# Patient Record
Sex: MALE | Race: WHITE | URBAN - METROPOLITAN AREA
[De-identification: names, ages, dates, MRNs, and addresses within clinical notes are randomized per-mention and may not be internally consistent; named-entity substitution may affect disease eponyms.]

---

## 2021-08-25 ENCOUNTER — OFFICE VISIT (OUTPATIENT)
Dept: URBAN - METROPOLITAN AREA CLINIC 45 | Facility: CLINIC | Age: 71
End: 2021-08-25
Payer: MEDICARE

## 2021-08-25 DIAGNOSIS — H02.9 DISORDER OF EYELID: ICD-10-CM

## 2021-08-25 PROCEDURE — 99203 OFFICE O/P NEW LOW 30 MIN: CPT | Performed by: OPTOMETRIST

## 2021-08-25 ASSESSMENT — INTRAOCULAR PRESSURE
OS: 15
OD: 16

## 2021-08-25 NOTE — IMPRESSION/PLAN
Impression: Disorder of eyelid: H02.9. Plan: Lid lesion OS, UL, mild. Recommend eval w/specialist prn.

## 2021-08-25 NOTE — IMPRESSION/PLAN
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 Plan: Discussed condition w/pt. Multiple lashes inverted. Recommend consult w/oculoplastic specialist for further treatment options. Use lubricating drops prn OD until consult.

## 2021-10-21 ENCOUNTER — OFFICE VISIT (OUTPATIENT)
Dept: URBAN - METROPOLITAN AREA CLINIC 44 | Facility: CLINIC | Age: 71
End: 2021-10-21
Payer: MEDICARE

## 2021-10-21 DIAGNOSIS — H02.051 TRICHIASIS WITHOUT ENTROPION RIGHT UPPER EYELID: Primary | ICD-10-CM

## 2021-10-21 PROCEDURE — 99203 OFFICE O/P NEW LOW 30 MIN: CPT | Performed by: OPHTHALMOLOGY

## 2021-10-21 NOTE — IMPRESSION/PLAN
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 Plan: no trichiatic lashes or conjunctival scarring today. recently had lashes epilated with his optometrist. 

RTC 8 weeks, will re-eval trichiasis and consider ellman ablation. long lashes trimmed today.  mild lash ptosis OU

## 2021-12-16 ENCOUNTER — OFFICE VISIT (OUTPATIENT)
Dept: URBAN - METROPOLITAN AREA CLINIC 44 | Facility: CLINIC | Age: 71
End: 2021-12-16
Payer: MEDICARE

## 2021-12-16 PROCEDURE — 99213 OFFICE O/P EST LOW 20 MIN: CPT | Performed by: OPHTHALMOLOGY

## 2021-12-16 NOTE — IMPRESSION/PLAN
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 Plan: no complaints today. lashes trimmed at last appointment. No trichiasis or discomfort today. RTC PRN.  Okay to have lashes trimmed by primary optometrist.